# Patient Record
Sex: FEMALE
[De-identification: names, ages, dates, MRNs, and addresses within clinical notes are randomized per-mention and may not be internally consistent; named-entity substitution may affect disease eponyms.]

---

## 2023-01-16 ENCOUNTER — HOSPITAL ENCOUNTER (EMERGENCY)
Dept: HOSPITAL 97 - ER | Age: 28
Discharge: HOME | End: 2023-01-16
Payer: COMMERCIAL

## 2023-01-16 VITALS — OXYGEN SATURATION: 100 % | SYSTOLIC BLOOD PRESSURE: 118 MMHG | DIASTOLIC BLOOD PRESSURE: 68 MMHG | TEMPERATURE: 98.6 F

## 2023-01-16 DIAGNOSIS — H10.021: Primary | ICD-10-CM

## 2023-01-16 DIAGNOSIS — H00.014: ICD-10-CM

## 2023-01-16 PROCEDURE — 99281 EMR DPT VST MAYX REQ PHY/QHP: CPT

## 2023-01-16 NOTE — EDPHYS
Physician Documentation                                                                           

 [facility]                                                                 

Name: Vera Urbina                                                                               

Age: 27 yrs                                                                                       

Sex: Female                                                                                       

: 1995                                                                                   

MRN: [id number]                                                                                   

Arrival Date: 2023                                                                          

Time: 12:39                                                                                       

Account#: [id number]                                                                            

Bed Waiting                                                                                       

Private MD:                                                                                       

ED Physician Yuniel Raygoza                                                                     

HPI:                                                                                              

                                                                                             

13:05 This 27 yrs old  Female presents to ER via Ambulatory with complaints of Eye    jh7 

      Swelling.                                                                                   

13:05 The patient is experiencing matting or discharge, pain, redness, to the left eye.       jh7 

      Onset: The symptoms/episode began/occurred yesterday. Associated signs and symptoms:        

      Pertinent negatives: chills, dizziness, ear ache, fever, headache, runny nose.              

                                                                                                  

OB/GYN:                                                                                           

13:04 LMP 2023                                                                              jh5 

                                                                                                  

Historical:                                                                                       

- Allergies:                                                                                      

13:04 No Known Allergies;                                                                     jh5 

- PMHx:                                                                                           

13:04 None;                                                                                   jh5 

                                                                                                  

- Immunization history:: Adult Immunizations up to date.                                          

- Social history:: Smoking status: Patient denies any tobacco usage or history of.                

                                                                                                  

                                                                                                  

ROS:                                                                                              

13:05 Constitutional: Negative for fever, chills, and weight loss, ENT: Negative for injury,  jh7 

      pain, and discharge, Cardiovascular: Negative for chest pain, palpitations, and edema,      

      Respiratory: Negative for shortness of breath, cough, wheezing, and pleuritic chest         

      pain, Back: Negative for injury and pain, MS/Extremity: Negative for injury and             

      deformity, Skin: Negative for injury, rash, and discoloration, Neuro: Negative for          

      headache, weakness, numbness, tingling, and seizure.                                        

13:05 Eyes: Positive for discharge, itching, matting, pain, redness, swelling, Negative for       

      vision loss, visual disturbance.                                                            

13:05 All other systems are negative.                                                             

                                                                                                  

Exam:                                                                                             

13:05 Constitutional:  This is a well developed, well nourished patient who is awake, alert,  jh7 

      and in no acute distress. Head/Face:  Normocephalic, atraumatic. ENT:  Nares patent. No     

      nasal discharge, no septal abnormalities noted.  Tympanic membranes are normal and          

      external auditory canals are clear.  Oropharynx with no redness, swelling, or masses,       

      exudates, or evidence of obstruction, uvula midline.  Mucous membranes moist.               

      Cardiovascular:  Regular rate and rhythm with a normal S1 and S2.  No gallops, murmurs,     

      or rubs.  Normal PMI, no JVD.  No pulse deficits. Respiratory:  Lungs have equal breath     

      sounds bilaterally, clear to auscultation and percussion.  No rales, rhonchi or wheezes     

      noted.  No increased work of breathing, no retractions or nasal flaring. Skin:  Warm,       

      dry with normal turgor.  Normal color with no rashes, no lesions, and no evidence of        

      cellulitis. MS/ Extremity:  Pulses equal, no cyanosis.  Neurovascular intact.  Full,        

      normal range of motion. Neuro:  Awake and alert, GCS 15, oriented to person, place,         

      time, and situation.  Motor strength 5/5 in all extremities.  Sensory grossly intact.       

      Normal gait.                                                                                

13:05 Eyes: Periorbital structures: L upper stye, Pupils: equal, round, and reactive to light     

      and accomodation, Extraocular movements: intact throughout, Conjunctiva: exudate, in        

      the left eye, injected, in the left eye.                                                    

                                                                                                  

Vital Signs:                                                                                      

13:03  / 68; Pulse 72; Resp 16; Temp 98.6; Pulse Ox 100% ; Weight 65.77 kg; Height 5    [facility] 

      ft. 0 in. (152.40 cm); Pain 0/10;                                                           

13:03 Body Mass Index 28.32 (65.77 kg, 152.40 cm)                                             [facility] 

                                                                                                  

MDM:                                                                                              

12:45 Patient medically screened.                                                             [facility] 

12:50 Differential diagnosis: Corneal abrasion of left eye. Infectious conjunctivitis in left [facility] 

      eye. Stye. Data reviewed: vital signs, nurses notes. I considered the following             

      discharge prescriptions or medication management in the emergency department Prescribed     

      erythromycin ointment. Test considered but Not performed: Other Details Fluorescein         

      stain not done due to the patient experiencing no foreign body sensation or pain.           

      Counseling: I had a detailed discussion with the patient and/or guardian regarding: the     

      historical points, exam findings, and any diagnostic results supporting the                 

      discharge/admit diagnosis, to return to the emergency department if symptoms worsen or      

      persist or if there are any questions or concerns that arise at home.                       

                                                                                                  

Administered Medications:                                                                         

No medications were administered                                                                  

                                                                                                  

                                                                                                  

Disposition:                                                                                      

16:18 Co-signature as Attending Physician, Yuniel Raygoza MD I reviewed the patient's care   rt  

      provided by the Advanced Practice Provider and agree with the diagnosis and treatment       

      plan.                                                                                       

                                                                                                  

Disposition Summary:                                                                              

23 12:57                                                                                    

Discharge Ordered                                                                                 

      Location: Home                                                                          [facility] 

      Problem: new                                                                            [facility] 

      Symptoms: are unchanged                                                                 [facility] 

      Condition: Stable                                                                       [facility] 

      Diagnosis                                                                                   

        - Other mucopurulent conjunctivitis, left eye                                         7 

        - Stye of the Left Upper Lid                                                          [facility] 

      Followup:                                                                               [facility] 

        - With: Private Physician                                                                  

        - When: 2 - 3 days                                                                         

        - Reason: Recheck today's complaints                                                       

      Discharge Instructions:                                                                     

        - Discharge Summary Sheet                                                             [facility] 

        - Bacterial Conjunctivitis, Adult                                                     7 

        - Stye                                                                                [facility] 

      Forms:                                                                                      

        - Medication Reconciliation Form                                                      [facility] 

        - Work release form                                                                   aa5 

        - Thank You Letter                                                                    [facility] 

        - Antibiotic Education                                                                [facility] 

      Prescriptions:                                                                              

        - Erythromycin 5 mg/gram (0.5 %) Ophthalmic Ointment                                       

            - apply 1 centimeter by OPHTHALMIC route 2-3 times daily for 7 days; 1 tube;      [facility] 

      Refills: 0, Product Selection Permitted                                                     

Signatures:                                                                                       

Lisette Murphy RN                       RN   5                                                  

Ramya Sylvester, SANTHOSHP                   FNP  7                                                  

Yuniel Raygoza MD MD   rt                                                   

                                                                                                  

**************************************************************************************************

## 2023-01-16 NOTE — ER
Nurse's Notes                                                                                     

 [facility]                                                                 

Name: Vera Urbina                                                                               

Age: 27 yrs                                                                                       

Sex: Female                                                                                       

: 1995                                                                                   

MRN: [id number]                                                                                   

Arrival Date: 2023                                                                          

Time: 12:39                                                                                       

Account#: [id number]                                                                            

Bed Waiting                                                                                       

Private MD:                                                                                       

Diagnosis: Other mucopurulent conjunctivitis, left eye;Stye of the Left Upper Lid                 

                                                                                                  

Presentation:                                                                                     

                                                                                             

13:03 Chief complaint: Patient states: i have this eye swelling started yesterday morning, i  5 

      think its a stye or something. Coronavirus screen: Vaccine status: Patient reports          

      being unvaccinated. Client denies travel out of the [place] in the last 14 days. Ebola         

      Screen: Patient negative for fever greater than or equal to 101.5 degrees Fahrenheit,       

      and additional compatible Ebola Virus Disease symptoms Patient denies exposure to           

      infectious person. Patient denies travel to an Ebola-affected area in the 21 days           

      before illness onset. Initial Sepsis Screen: Does the patient meet any 2 criteria? No.      

      Patient's initial sepsis screen is negative. Does the patient have a suspected source       

      of infection? No. Patient's initial sepsis screen is negative. Risk Assessment: Do you      

      want to hurt yourself or someone else? Patient reports no desire to harm self or others.    

13:03 Method Of Arrival: Ambulatory                                                           [facility] 

13:03 Acuity: ANJELICA 4                                                                           [facility] 

                                                                                                  

Triage Assessment:                                                                                

13:04 General: Appears in no apparent distress. Behavior is calm, cooperative, appropriate    [facility] 

      for age. Pain: Denies pain.                                                                 

                                                                                                  

OB/GYN:                                                                                           

13:04 [facility] 2023                                                                              [facility] 

                                                                                                  

Historical:                                                                                       

- Allergies:                                                                                      

13:04 No Known Allergies;                                                                     [facility] 

- PMHx:                                                                                           

13:04 None;                                                                                   [facility] 

                                                                                                  

- Immunization history:: Adult Immunizations up to date.                                          

- Social history:: Smoking status: Patient denies any tobacco usage or history of.                

                                                                                                  

                                                                                                  

Vital Signs:                                                                                      

13:03  / 68; Pulse 72; Resp 16; Temp 98.6; Pulse Ox 100% ; Weight 65.77 kg; Height 5    [facility] 

      ft. 0 in. (152.40 cm); Pain 0/10;                                                           

13:03 Body Mass Index 28.32 (65.77 kg, 152.40 cm)                                             [facility] 

                                                                                                  

ED Course:                                                                                        

12:39 Patient arrived in ED.                                                                  mr  

12:45 [facility]                                                          7 

12:45 Yuniel Raygoza MD is Attending Physician.                                            7 

13:04 Triage completed.                                                                       jh5 

13:04 Arm band placed on right wrist.                                                         jh5 

                                                                                                  

Administered Medications:                                                                         

No medications were administered                                                                  

                                                                                                  

                                                                                                  

Medication:                                                                                       

13:05 VIS not applicable for this client.                                                     5 

                                                                                                  

Outcome:                                                                                          

12:57 Discharge ordered by MD.                                                                jh7 

13:05 Discharged to home ambulatory.                                                          jh5 

13:05 Condition: good                                                                             

13:05 Discharge instructions given to patient.                                                    

13:05 Patient left the ED.                                                                    [facility] 

                                                                                                  

Signatures:                                                                                       

Opal Mitchell                                                   

JeffreyLisette, RN                       RN   5                                                  

Ramya Sylvester FNP                   FNP  [facility]                                                  

                                                                                                  

**************************************************************************************************